# Patient Record
Sex: MALE | Race: WHITE | NOT HISPANIC OR LATINO | Employment: FULL TIME | URBAN - METROPOLITAN AREA
[De-identification: names, ages, dates, MRNs, and addresses within clinical notes are randomized per-mention and may not be internally consistent; named-entity substitution may affect disease eponyms.]

---

## 2023-07-13 ENCOUNTER — HOSPITAL ENCOUNTER (EMERGENCY)
Facility: CLINIC | Age: 48
Discharge: HOME | End: 2023-07-13
Attending: EMERGENCY MEDICINE | Admitting: EMERGENCY MEDICINE

## 2023-07-13 ENCOUNTER — APPOINTMENT (OUTPATIENT)
Dept: CT IMAGING | Facility: CLINIC | Age: 48
End: 2023-07-13
Attending: EMERGENCY MEDICINE

## 2023-07-13 VITALS
HEART RATE: 91 BPM | RESPIRATION RATE: 18 BRPM | OXYGEN SATURATION: 97 % | DIASTOLIC BLOOD PRESSURE: 98 MMHG | TEMPERATURE: 98.1 F | SYSTOLIC BLOOD PRESSURE: 156 MMHG

## 2023-07-13 DIAGNOSIS — D18.03 LIVER HEMANGIOMA: ICD-10-CM

## 2023-07-13 DIAGNOSIS — M54.50 ACUTE RIGHT-SIDED LOW BACK PAIN WITHOUT SCIATICA: ICD-10-CM

## 2023-07-13 LAB
ALBUMIN UR-MCNC: NEGATIVE MG/DL
ANION GAP SERPL CALCULATED.3IONS-SCNC: 9 MMOL/L (ref 7–15)
APPEARANCE UR: CLEAR
BASOPHILS # BLD AUTO: 0.1 10E3/UL (ref 0–0.2)
BASOPHILS NFR BLD AUTO: 1 %
BILIRUB UR QL STRIP: NEGATIVE
BUN SERPL-MCNC: 8.9 MG/DL (ref 6–20)
CALCIUM SERPL-MCNC: 9.6 MG/DL (ref 8.6–10)
CHLORIDE SERPL-SCNC: 107 MMOL/L (ref 98–107)
COLOR UR AUTO: ABNORMAL
CREAT SERPL-MCNC: 0.95 MG/DL (ref 0.67–1.17)
DEPRECATED HCO3 PLAS-SCNC: 23 MMOL/L (ref 22–29)
EOSINOPHIL # BLD AUTO: 0.4 10E3/UL (ref 0–0.7)
EOSINOPHIL NFR BLD AUTO: 2 %
ERYTHROCYTE [DISTWIDTH] IN BLOOD BY AUTOMATED COUNT: 13.9 % (ref 10–15)
GFR SERPL CREATININE-BSD FRML MDRD: >90 ML/MIN/1.73M2
GLUCOSE SERPL-MCNC: 107 MG/DL (ref 70–99)
GLUCOSE UR STRIP-MCNC: NEGATIVE MG/DL
HCT VFR BLD AUTO: 45.9 % (ref 40–53)
HGB BLD-MCNC: 15.2 G/DL (ref 13.3–17.7)
HGB UR QL STRIP: NEGATIVE
IMM GRANULOCYTES # BLD: 0.1 10E3/UL
IMM GRANULOCYTES NFR BLD: 0 %
KETONES UR STRIP-MCNC: NEGATIVE MG/DL
LEUKOCYTE ESTERASE UR QL STRIP: NEGATIVE
LYMPHOCYTES # BLD AUTO: 2.7 10E3/UL (ref 0.8–5.3)
LYMPHOCYTES NFR BLD AUTO: 18 %
MCH RBC QN AUTO: 29.5 PG (ref 26.5–33)
MCHC RBC AUTO-ENTMCNC: 33.1 G/DL (ref 31.5–36.5)
MCV RBC AUTO: 89 FL (ref 78–100)
MONOCYTES # BLD AUTO: 1.2 10E3/UL (ref 0–1.3)
MONOCYTES NFR BLD AUTO: 8 %
MUCOUS THREADS #/AREA URNS LPF: PRESENT /LPF
NEUTROPHILS # BLD AUTO: 10.6 10E3/UL (ref 1.6–8.3)
NEUTROPHILS NFR BLD AUTO: 71 %
NITRATE UR QL: NEGATIVE
NRBC # BLD AUTO: 0 10E3/UL
NRBC BLD AUTO-RTO: 0 /100
PH UR STRIP: 8 [PH] (ref 5–7)
PLATELET # BLD AUTO: 244 10E3/UL (ref 150–450)
POTASSIUM SERPL-SCNC: 4.5 MMOL/L (ref 3.4–5.3)
RADIOLOGIST FLAGS: NORMAL
RBC # BLD AUTO: 5.16 10E6/UL (ref 4.4–5.9)
RBC URINE: <1 /HPF
SODIUM SERPL-SCNC: 139 MMOL/L (ref 136–145)
SP GR UR STRIP: 1.01 (ref 1–1.03)
UROBILINOGEN UR STRIP-MCNC: NORMAL MG/DL
WBC # BLD AUTO: 15 10E3/UL (ref 4–11)
WBC URINE: <1 /HPF

## 2023-07-13 PROCEDURE — 81001 URINALYSIS AUTO W/SCOPE: CPT | Performed by: EMERGENCY MEDICINE

## 2023-07-13 PROCEDURE — 36415 COLL VENOUS BLD VENIPUNCTURE: CPT | Performed by: EMERGENCY MEDICINE

## 2023-07-13 PROCEDURE — 250N000013 HC RX MED GY IP 250 OP 250 PS 637: Performed by: EMERGENCY MEDICINE

## 2023-07-13 PROCEDURE — 74176 CT ABD & PELVIS W/O CONTRAST: CPT

## 2023-07-13 PROCEDURE — 250N000011 HC RX IP 250 OP 636: Mod: JZ | Performed by: EMERGENCY MEDICINE

## 2023-07-13 PROCEDURE — 96374 THER/PROPH/DIAG INJ IV PUSH: CPT

## 2023-07-13 PROCEDURE — 99285 EMERGENCY DEPT VISIT HI MDM: CPT | Mod: 25

## 2023-07-13 PROCEDURE — 85025 COMPLETE CBC W/AUTO DIFF WBC: CPT | Performed by: EMERGENCY MEDICINE

## 2023-07-13 PROCEDURE — 82310 ASSAY OF CALCIUM: CPT | Performed by: EMERGENCY MEDICINE

## 2023-07-13 RX ORDER — KETOROLAC TROMETHAMINE 15 MG/ML
15 INJECTION, SOLUTION INTRAMUSCULAR; INTRAVENOUS ONCE
Status: COMPLETED | OUTPATIENT
Start: 2023-07-13 | End: 2023-07-13

## 2023-07-13 RX ORDER — METHOCARBAMOL 750 MG/1
750 TABLET, FILM COATED ORAL ONCE
Status: COMPLETED | OUTPATIENT
Start: 2023-07-13 | End: 2023-07-13

## 2023-07-13 RX ORDER — NAPROXEN 250 MG/1
250 TABLET ORAL 2 TIMES DAILY PRN
Qty: 30 TABLET | Refills: 0 | Status: SHIPPED | OUTPATIENT
Start: 2023-07-13

## 2023-07-13 RX ORDER — CYCLOBENZAPRINE HCL 10 MG
5-10 TABLET ORAL 3 TIMES DAILY PRN
Qty: 20 TABLET | Refills: 0 | Status: SHIPPED | OUTPATIENT
Start: 2023-07-13

## 2023-07-13 RX ADMIN — METHOCARBAMOL 750 MG: 750 TABLET ORAL at 19:39

## 2023-07-13 RX ADMIN — KETOROLAC TROMETHAMINE 15 MG: 15 INJECTION, SOLUTION INTRAMUSCULAR; INTRAVENOUS at 19:39

## 2023-07-13 ASSESSMENT — ACTIVITIES OF DAILY LIVING (ADL)
ADLS_ACUITY_SCORE: 33
ADLS_ACUITY_SCORE: 35

## 2023-07-13 NOTE — ED PROVIDER NOTES
History   Chief Complaint:  Back Pain    The history is provided by the patient.      Xavier Damian is a 48 year old male who presents with right lower back pain. The patient reports that he has had the pain for 1.5 months, beginning when he was helping his dad with his deck. He has been seen for this pain before, for which he has received 2 steroid packs without alleviation. He finished his last steroid pack 3 days ago on 7/10/23. He denies any injury, or trauma to the area. Being active and moving helps the pain, but being sedentary too long makes it worse. No fever, bowel incontinence, urinary incontinence, abdominal pain, vomiting, or prior back surgeries. He has some groin and hip pain on the right side, which he somewhat attributes to his gait. Occasionally it radiates down his leg to his toes. He has tingling occasionally at baseline in his toes. He reports that 4-5 years ago had an MRI which showed a C4 slipped disc, pinching his nerve, but had no intervention, and it all went away.     Independent Historian:   None - Patient Only    Review of External Notes:   None     Medications:    The patient is currently on no regular medications.     Past Medical History:    Anxiety    Past Surgical History:    The patient denies any past surgical history.      Physical Exam     Patient Vitals for the past 24 hrs:   BP Temp Pulse Resp SpO2   07/13/23 1707 (!) 156/98 98.1  F (36.7  C) 91 18 97 %      Physical Exam  General: Alert, no acute distress; well appearing  HEENT:  Moist mucous membranes.  Conjunctiva normal.   CV:  RRR, no m/r/g, skin warm and well perfused  Pulm:  CTAB, no wheezes/ronchi/rales.  No acute distress, breathing comfortably  GI:  Soft, nontender, nondistended.  No rebound or guarding. No masses  MSK:  Moving all extremities.  No focal areas of edema, erythema; no midline spine tenderness or stepoff.  Right CVA tenderness with percussion.  SLR negative b/l.  5/5 ankle dorsiflexion/plantarflexion.   SILT throughout RLE.  Skin:  WWP, no rashes, skin color normal, no diaphoresis    Emergency Department Course   Imaging:  Abd/pelvis CT no contrast - Stone Protocol   Final Result   IMPRESSION:       1.  No acute abnormality, within the limitations of a noncontrast exam.      2.  No urinary calculi or hydronephrosis.      3.  Indeterminate 1.4 cm right hepatic lesion, possibly a hemangioma. This would be best characterized with a liver MRI, which can be performed on a nonemergent basis.      [Recommend Follow Up: Liver lesion]      This report will be copied to the [facility] to ensure a provider acknowledges the finding.          Report per radiology    Laboratory:  Labs Ordered and Resulted from Time of ED Arrival to Time of ED Departure   BASIC METABOLIC PANEL - Abnormal       Result Value    Sodium 139      Potassium 4.5      Chloride 107      Carbon Dioxide (CO2) 23      Anion Gap 9      Urea Nitrogen 8.9      Creatinine 0.95      Calcium 9.6      Glucose 107 (*)     GFR Estimate >90     ROUTINE UA WITH MICROSCOPIC REFLEX TO CULTURE - Abnormal    Color Urine Light Yellow      Appearance Urine Clear      Glucose Urine Negative      Bilirubin Urine Negative      Ketones Urine Negative      Specific Gravity Urine 1.015      Blood Urine Negative      pH Urine 8.0 (*)     Protein Albumin Urine Negative      Urobilinogen Urine Normal      Nitrite Urine Negative      Leukocyte Esterase Urine Negative      Mucus Urine Present (*)     RBC Urine <1      WBC Urine <1     CBC WITH PLATELETS AND DIFFERENTIAL - Abnormal    WBC Count 15.0 (*)     RBC Count 5.16      Hemoglobin 15.2      Hematocrit 45.9      MCV 89      MCH 29.5      MCHC 33.1      RDW 13.9      Platelet Count 244      % Neutrophils 71      % Lymphocytes 18      % Monocytes 8      % Eosinophils 2      % Basophils 1      % Immature Granulocytes 0      NRBCs per 100 WBC 0      Absolute Neutrophils 10.6 (*)     Absolute Lymphocytes 2.7      Absolute  Monocytes 1.2      Absolute Eosinophils 0.4      Absolute Basophils 0.1      Absolute Immature Granulocytes 0.1      Absolute NRBCs 0.0       Emergency Department Course & Assessments:  Interventions:  Medications   ketorolac (TORADOL) injection 15 mg (15 mg Intravenous $Given 23)   methocarbamol (ROBAXIN) tablet 750 mg (750 mg Oral $Given 23)     Assessments:   I obtained history and examined the patient as reported above.    I rechecked the patient and explained findings.    I rechecked the patient and discussed his discharge to home.     Independent Interpretation (X-rays, CTs, rhythm strip):  None    Consultations/Discussion of Management or Tests:  None     Social Determinants of Health affecting care:   None    Disposition:  The patient was discharged to home.     Impression & Plan    Medical Decision Makin-year-old male presenting to the ER for evaluation of nontraumatic right flank/low back pain.  Please above for details of HPI and exam.  Reports symptoms have been present over the last 1.5 months and has had 2 steroid packs and muscle relaxants without relief.  There is been no history of trauma, I do not feel x-rays are indicated.  Furthermore, there are no red flag symptoms to suggest spinal cord compression syndrome such as cauda equina or other emergent pathology requiring MRI imaging such as osteomyelitis, discitis,  spinal epidural abscess/hematoma.  Doubt vascular emergency causing referred flank pain.  Did consider nephrolithiasis and MSK etiologies.  Lab studies and UA remarkable for leukocytosis, I suspect this is from recent steroid use; no localizing signs/symptoms of infection.  CT is overall unremarkable for acute pathology to explain patient's pain; noted incidental likely liver hemangioma discussed with patient, recommend he follow up with is PCP in [place] for further nonemergent eval with MRI and he understands and agrees.  Suspect muscle strain with  significant spasms, possibly radicular in nature given description of symptoms.  Recommend NSAIDs, muscle relaxants, ice/heat. He is comfortable with this plan.   With reasonable clinical certainty, I feel that he is safe to discharge home to follow-up with his PCP regarding his ER visit.  He is comfortable with this plan.  Discussed return precautions for the emergency department.  All questions were answered prior to discharge.    Diagnosis:    ICD-10-CM    1. Acute right-sided low back pain without sciatica  M54.50       2. Liver hemangioma  D18.03         Discharge Medications:  Discharge Medication List as of 7/13/2023  8:54 PM      START taking these medications    Details   cyclobenzaprine (FLEXERIL) 10 MG tablet Take 0.5-1 tablets (5-10 mg) by mouth 3 times daily as needed for muscle spasms, Disp-20 tablet, R-0, Local Print      naproxen (NAPROSYN) 250 MG tablet Take 1 tablet (250 mg) by mouth 2 times daily as needed for moderate pain, Disp-30 tablet, R-0, Local Print           Scribe Disclosure:  I, John Barajas, am serving as a scribe at 6:32 PM on 7/13/2023 to document services personally performed by Giovanni Christine MD based on my observations and the provider's statements to me.      7/13/2023   Giovanni Christine MD [place], Edgar Ronald, MD  07/13/23 2133

## 2023-07-13 NOTE — ED TRIAGE NOTES
Patient reports right lower back pain that has been ongoing for 1.5 months. Patient has been seen previously for this same issue. Patient denies known injury     Triage Assessment     Row Name 07/13/23 6296       Triage Assessment (Adult)    Airway WDL WDL       Respiratory WDL    Respiratory WDL WDL       Skin Circulation/Temperature WDL    Skin Circulation/Temperature WDL WDL       Cardiac WDL    Cardiac WDL WDL       Peripheral/Neurovascular WDL    Peripheral Neurovascular WDL WDL       Cognitive/Neuro/Behavioral WDL    Cognitive/Neuro/Behavioral WDL WDL

## 2023-07-13 NOTE — DISCHARGE INSTRUCTIONS
Follow up with your primary care doctor regarding your  ER visit    Also follow up with your primary care doctor regarding the hemangioma (grouping of blood vessels) seen in your liver which can be further characterized by non-emergent MRI of the liver.      Return to the ER if you develop any new or worsening symptoms.       Discharge Instructions  Back Pain  You were seen today for back pain. Back pain can have many causes, but most will get better without surgery or other specific treatment. Sometimes there is a herniated ( slipped ) disc. We do not usually do MRI scans to look for these right away, since most herniated discs will get better on their own with time.  Today, we did not find any evidence that your back pain was caused by a serious condition. However, sometimes symptoms develop over time and cannot be found during an emergency visit, so it is very important that you follow up with your primary provider.  Generally, every Emergency Department visit should have a follow-up clinic visit with either a primary or a specialty clinic/provider. Please follow-up as instructed by your emergency provider today.    Return to the Emergency Department if:  You develop a fever with your back pain.   You have weakness or change in sensation in one or both legs.  You lose control of your bowels or bladder, or cannot empty your bladder (cannot pee).  Your pain gets much worse.     Follow-up with your provider:  Unless your pain has completely gone away, please make an appointment with your provider within one week. Most of the routine care for back pain is available in a clinic and not the Emergency Department. You may need further management of your back pain, such as more pain medication, imaging such as an X-ray or MRI, or physical therapy.    What can I do to help myself?  Remain Active -- People are often afraid that they will hurt their back further or delay recovery by remaining active, but this is one of the  best things you can do for your back. In fact, staying in bed for a long time to rest is not recommended. Studies have shown that people with low back pain recover faster when they remain active. Movement helps to bring blood flow to the muscles and relieve muscle spasms as well as preventing loss of muscle strength.  Heat -- Using a heating pad can help with low back pain during the first few weeks. Do not sleep with a heating pad, as you can be burned.   Pain medications - You may take a pain medication such as Tylenol  (acetaminophen), Advil , Motrin  (ibuprofen) or Aleve  (naproxen).  If you were given a prescription for medicine here today, be sure to read all of the information (including the package insert) that comes with your prescription.  This will include important information about the medicine, its side effects, and any warnings that you need to know about.  The pharmacist who fills the prescription can provide more information and answer questions you may have about the medicine.  If you have questions or concerns that the pharmacist cannot address, please call or return to the Emergency Department.   Remember that you can always come back to the Emergency Department if you are not able to see your regular provider in the amount of time listed above, if you get any new symptoms, or if there is anything that worries you.